# Patient Record
Sex: FEMALE | Race: AMERICAN INDIAN OR ALASKA NATIVE | NOT HISPANIC OR LATINO | Employment: UNEMPLOYED
[De-identification: names, ages, dates, MRNs, and addresses within clinical notes are randomized per-mention and may not be internally consistent; named-entity substitution may affect disease eponyms.]

---

## 2017-03-23 ENCOUNTER — RECORDS - HEALTHEAST (OUTPATIENT)
Dept: ADMINISTRATIVE | Facility: OTHER | Age: 32
End: 2017-03-23

## 2017-03-28 ENCOUNTER — AMBULATORY - HEALTHEAST (OUTPATIENT)
Dept: HEALTH INFORMATION MANAGEMENT | Facility: CLINIC | Age: 32
End: 2017-03-28

## 2017-09-27 ENCOUNTER — OFFICE VISIT (OUTPATIENT)
Dept: FAMILY MEDICINE | Facility: CLINIC | Age: 32
End: 2017-09-27
Payer: COMMERCIAL

## 2017-09-27 VITALS
RESPIRATION RATE: 18 BRPM | HEART RATE: 74 BPM | TEMPERATURE: 97.8 F | WEIGHT: 242 LBS | SYSTOLIC BLOOD PRESSURE: 120 MMHG | OXYGEN SATURATION: 98 % | BODY MASS INDEX: 45.73 KG/M2 | DIASTOLIC BLOOD PRESSURE: 84 MMHG

## 2017-09-27 DIAGNOSIS — F43.23 ADJUSTMENT DISORDER WITH MIXED ANXIETY AND DEPRESSED MOOD: Primary | ICD-10-CM

## 2017-09-27 DIAGNOSIS — F43.21 GRIEF REACTION: ICD-10-CM

## 2017-09-27 DIAGNOSIS — F41.0 PANIC DISORDER WITHOUT AGORAPHOBIA: ICD-10-CM

## 2017-09-27 PROCEDURE — 99203 OFFICE O/P NEW LOW 30 MIN: CPT | Performed by: PHYSICIAN ASSISTANT

## 2017-09-27 RX ORDER — ALPRAZOLAM 0.25 MG
0.25 TABLET ORAL DAILY PRN
Qty: 20 TABLET | Refills: 0 | Status: SHIPPED | OUTPATIENT
Start: 2017-09-27 | End: 2017-10-20

## 2017-09-27 RX ORDER — LORAZEPAM 0.5 MG/1
0.5 TABLET ORAL DAILY PRN
Qty: 20 TABLET | Refills: 0 | Status: SHIPPED | OUTPATIENT
Start: 2017-09-27 | End: 2017-09-27

## 2017-09-27 RX ORDER — TRAZODONE HYDROCHLORIDE 50 MG/1
50 TABLET, FILM COATED ORAL AT BEDTIME
Qty: 30 TABLET | Refills: 1 | Status: SHIPPED | OUTPATIENT
Start: 2017-09-27 | End: 2017-11-17 | Stop reason: SINTOL

## 2017-09-27 ASSESSMENT — ANXIETY QUESTIONNAIRES
7. FEELING AFRAID AS IF SOMETHING AWFUL MIGHT HAPPEN: NEARLY EVERY DAY
IF YOU CHECKED OFF ANY PROBLEMS ON THIS QUESTIONNAIRE, HOW DIFFICULT HAVE THESE PROBLEMS MADE IT FOR YOU TO DO YOUR WORK, TAKE CARE OF THINGS AT HOME, OR GET ALONG WITH OTHER PEOPLE: VERY DIFFICULT
2. NOT BEING ABLE TO STOP OR CONTROL WORRYING: NEARLY EVERY DAY
5. BEING SO RESTLESS THAT IT IS HARD TO SIT STILL: NEARLY EVERY DAY
3. WORRYING TOO MUCH ABOUT DIFFERENT THINGS: NEARLY EVERY DAY
6. BECOMING EASILY ANNOYED OR IRRITABLE: NEARLY EVERY DAY
1. FEELING NERVOUS, ANXIOUS, OR ON EDGE: NEARLY EVERY DAY
GAD7 TOTAL SCORE: 21

## 2017-09-27 ASSESSMENT — PATIENT HEALTH QUESTIONNAIRE - PHQ9
5. POOR APPETITE OR OVEREATING: NEARLY EVERY DAY
SUM OF ALL RESPONSES TO PHQ QUESTIONS 1-9: 14

## 2017-09-27 NOTE — MR AVS SNAPSHOT
After Visit Summary   9/27/2017    Sabine Burnett    MRN: [id number]           Patient Information     Date Of Birth          1985        Visit Information        Provider Department      9/27/2017 1:50 PM Magdalena Schwarz PA-C [facility]        Today's Diagnoses     Adjustment disorder with mixed anxiety and depressed mood    -  1       Follow-ups after your visit        Additional Services     MENTAL HEALTH REFERRAL       Your provider has referred you to: [facility]: [facility] - Counseling (Individual/Couples/Family) - [facility] [phone]   [email or web address]   *Patient will be contacted by [facility] scheduling partner, [facility]), to schedule an appointment.  Patients may also call [facility] to schedule.    All scheduling is subject to the client's specific insurance plan & benefits, provider/location availability, and provider clinical specialities.  Please arrive 15 minutes early for your first appointment and bring your completed paperwork.    Please be aware that coverage of these services is subject to the terms and limitations of your health insurance plan.  Call member services at your health plan with any benefit or coverage questions.                  Who to contact     If you have questions or need follow up information about today's clinic visit or your schedule please contact [facility] directly at [phone].  Normal or non-critical lab and imaging results will be communicated to you by MyChart, letter or phone within 4 business days after the clinic has received the results. If you do not hear from us within 7 days, please contact the clinic through MyChart or phone. If you have a critical or abnormal lab result, we will notify you by phone as soon as possible.  Submit refill requests through  "MyChart or call your pharmacy and they will forward the refill request to us. Please allow 3 business days for your refill to be completed.          Additional Information About Your Visit        MyChart Information     [facility] lets you send messages to your doctor, view your test results, renew your prescriptions, schedule appointments and more. To sign up, go to [email or web address] . Click on \"Log in\" on the left side of the screen, which will take you to the Welcome page. Then click on \"Sign up Now\" on the right side of the page.     You will be asked to enter the access code listed below, as well as some personal information. Please follow the directions to create your username and password.     Your access code is: [email or web address]  Expires: 2017  2:36 PM     Your access code will  in 90 days. If you need help or a new code, please call your [place] clinic or [phone].        Care EveryWhere ID     This is your Care EveryWhere ID. This could be used by other organizations to access your [place] medical records  [id number]        Your Vitals Were     Pulse Temperature Respirations Pulse Oximetry BMI (Body Mass Index)       74 97.8  F (36.6  C) 18 98% 45.73 kg/m2        Blood Pressure from Last 3 Encounters:   17 120/84   16 118/59   11/15/14 (!) 148/98    Weight from Last 3 Encounters:   17 242 lb (109.8 kg)   11/15/14 245 lb (111.1 kg)   13 215 lb (97.5 kg)              We Performed the Following     MENTAL HEALTH REFERRAL          Today's Medication Changes          These changes are accurate as of: 17  2:36 PM.  If you have any questions, ask your nurse or doctor.               Start taking these medicines.        Dose/Directions    LORazepam 0.5 MG tablet   Commonly known as:  ATIVAN   Used for:  Adjustment disorder with mixed anxiety and depressed mood   Started by:  Magdalena Schwarz PA-C        Dose:  0.5 mg   Take 1 tablet (0.5 mg) by mouth daily as " needed for anxiety Do not operate a vehicle after taking this medication   Quantity:  20 tablet   Refills:  0       traZODone 50 MG tablet   Commonly known as:  DESYREL   Used for:  Adjustment disorder with mixed anxiety and depressed mood   Started by:  Magdalena Schawrz PA-C        Dose:  50 mg   Take 1 tablet (50 mg) by mouth At Bedtime   Quantity:  30 tablet   Refills:  1            Where to get your medicines      These medications were sent to [facility] [address] [facility] [address] AT [facility] [address] [facility]  [address] [facility] [address]     Phone:  [phone]     traZODone 50 MG tablet         Some of these will need a paper prescription and others can be bought over the counter.  Ask your nurse if you have questions.     Bring a paper prescription for each of these medications     LORazepam 0.5 MG tablet                Primary Care Provider    Physician No Ref-Primary       No address on file        Equal Access to Services     WOODROW HOLLOWAY : Hadii williams Harris, waaxda luqadaha, qaybta kaalmada adeegyada, arabella gomes . So [facility] [phone].    ATENCIÓN: Si habla español, tiene a cutler disposición servicios gratuitos de asistencia lingüística. Any al [phone].    We comply with applicable federal civil rights laws and [place] laws. We do not discriminate on the basis of race, color, national origin, age, disability sex, sexual orientation or gender identity.            Thank you!     Thank you for choosing [facility]  for your care. Our goal is always to provide you with excellent care. Hearing back from our patients is one way we can continue to improve our services. Please take a few minutes to complete the written survey that you may receive in the mail after your visit with us. Thank you!             Your Updated Medication List - Protect others around you: Learn how to safely  use, store and throw away your medicines at www.disposemymeds.org.          This list is accurate as of: 9/27/17  2:36 PM.  Always use your most recent med list.                   Brand Name Dispense Instructions for use Diagnosis    carisoprodol 350 MG tablet    SOMA    60 tablet    Take 1 tablet (350 mg) by mouth 3 times daily as needed for muscle spasms    Back pain       LORazepam 0.5 MG tablet    ATIVAN    20 tablet    Take 1 tablet (0.5 mg) by mouth daily as needed for anxiety Do not operate a vehicle after taking this medication    Adjustment disorder with mixed anxiety and depressed mood       traMADol 50 MG tablet    ULTRAM    20 tablet    Take 1 tablet (50 mg) by mouth every 6 hours as needed for moderate pain        traZODone 50 MG tablet    DESYREL    30 tablet    Take 1 tablet (50 mg) by mouth At Bedtime    Adjustment disorder with mixed anxiety and depressed mood       zolpidem 10 MG tablet    AMBIEN    30 tablet    Take 0.5 tablets (5 mg) by mouth nightly as needed for sleep    Hypersomnia with sleep apnoea
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
## 2017-09-27 NOTE — NURSING NOTE
"Chief Complaint   Patient presents with     Anxiety     lost son on 9/2/2017 also lost an uncle and adoptive mom this month       Initial /84  Pulse 74  Temp 97.8  F (36.6  C)  Resp 18  Wt 242 lb (109.8 kg)  SpO2 98%  BMI 45.73 kg/m2 Estimated body mass index is 45.73 kg/(m^2) as calculated from the following:    Height as of 9/30/16: 5' 1\" (1.549 m).    Weight as of this encounter: 242 lb (109.8 kg).  Medication Reconciliation: dedrick Sierra CMA      "

## 2017-09-28 ASSESSMENT — ANXIETY QUESTIONNAIRES: GAD7 TOTAL SCORE: 21

## 2017-10-06 PROBLEM — F41.0 PANIC DISORDER WITHOUT AGORAPHOBIA: Status: ACTIVE | Noted: 2017-10-06

## 2017-10-06 PROBLEM — F43.23 ADJUSTMENT DISORDER WITH MIXED ANXIETY AND DEPRESSED MOOD: Status: ACTIVE | Noted: 2017-10-06

## 2017-10-20 ENCOUNTER — OFFICE VISIT (OUTPATIENT)
Dept: FAMILY MEDICINE | Facility: CLINIC | Age: 32
End: 2017-10-20
Payer: COMMERCIAL

## 2017-10-20 VITALS
DIASTOLIC BLOOD PRESSURE: 86 MMHG | RESPIRATION RATE: 18 BRPM | WEIGHT: 240 LBS | TEMPERATURE: 98.2 F | BODY MASS INDEX: 45.35 KG/M2 | OXYGEN SATURATION: 98 % | HEART RATE: 80 BPM | SYSTOLIC BLOOD PRESSURE: 130 MMHG

## 2017-10-20 DIAGNOSIS — F41.0 PANIC DISORDER WITHOUT AGORAPHOBIA: ICD-10-CM

## 2017-10-20 DIAGNOSIS — F43.23 ADJUSTMENT DISORDER WITH MIXED ANXIETY AND DEPRESSED MOOD: Primary | ICD-10-CM

## 2017-10-20 DIAGNOSIS — F43.21 GRIEF REACTION: ICD-10-CM

## 2017-10-20 PROBLEM — E66.01 MORBID OBESITY (H): Status: ACTIVE | Noted: 2017-10-20

## 2017-10-20 PROCEDURE — 99214 OFFICE O/P EST MOD 30 MIN: CPT | Performed by: PHYSICIAN ASSISTANT

## 2017-10-20 RX ORDER — ALPRAZOLAM 1 MG
1 TABLET ORAL DAILY PRN
Qty: 30 TABLET | Refills: 0 | Status: SHIPPED | OUTPATIENT
Start: 2017-10-20 | End: 2017-11-17

## 2017-10-20 RX ORDER — FLUOXETINE 10 MG/1
TABLET, FILM COATED ORAL
Qty: 60 TABLET | Refills: 0 | Status: SHIPPED | OUTPATIENT
Start: 2017-10-20 | End: 2017-11-17

## 2017-10-20 NOTE — NURSING NOTE
"Chief Complaint   Patient presents with     RECHECK       Initial /86  Pulse 80  Temp 98.2  F (36.8  C)  Resp 18  Wt 240 lb (108.9 kg)  SpO2 98%  BMI 45.35 kg/m2 Estimated body mass index is 45.35 kg/(m^2) as calculated from the following:    Height as of 9/30/16: 5' 1\" (1.549 m).    Weight as of this encounter: 240 lb (108.9 kg).  Medication Reconciliation: dedrick Sierra CMA      "

## 2017-10-20 NOTE — PROGRESS NOTES
SUBJECTIVE:   Sabine Burnett is a 32 year old female who presents to clinic today for the following health issues:      Depression and Anxiety Follow-Up    Status since last visit: Worsened pt says she is not getting better and feels worse.  Pt states that they cant tell her why her son . Pt also states that there were 5 deaths in her family with in 2 weeks.    Other associated symptoms:None    Complicating factors:     Significant life event: Yes-  Death of son     Current substance abuse: None    PHQ-9 Score and MyChart F/U Questions 2017   Total Score 14   Q9: Suicide Ideation Not at all     JOSE-7 SCORE 2017   Total Score 21     PHQ-9  English  PHQ-9   Any Language  GAD7  Suicide Assessment Five-step Evaluation and Treatment (SAFE-T)      HPI additional notes:   Chief Complaint   Patient presents with     RECHECK     Rosenda presents today for f/u anxiety and grief reaction. Patient's son passed away approx 1 month ago. Started on trazodone qHS and Xanax qd prn during our initial visit on 2017. Patient reports she is able to get consistent sleep with assistance of trazodone. However, feels like anxiety and depression have worsened as autopsy didn't reveal cause of death of her son. Patient feels like Xanax isn't strong enough, states she needed the 1 mg dose in the past to be effective. Has been using 1-2 times per day. Never scheduled with  as she had wrong phone number listed in her chart.     ROS:  Skin: negative  Eyes: negative  Ears/Nose/Throat: negative  Respiratory: No shortness of breath, dyspnea on exertion, cough, or hemoptysis  Cardiovascular: negative  Gastrointestinal: negative  Genitourinary: negative  Musculoskeletal: negative  Neurologic: negative  Psychiatric: as above  Hematologic/Lymphatic/Immunologic: negative  Endocrine: negative    Chart Review:  History   Smoking Status     Current Every Day Smoker     Packs/day: 0.50     Years: 6.00     Types: Cigarettes    Smokeless Tobacco     Never Used     Comment: declined quit program 3/19/12       Patient Active Problem List   Diagnosis     Back pain     Adjustment disorder with mixed anxiety and depressed mood     Panic disorder without agoraphobia     History reviewed. No pertinent surgical history.  Problem list, Medication list, Allergies, Medical/Social/Surg hx reviewed in [facility], updated as appropriate.   OBJECTIVE:                                                    /86  Pulse 80  Temp 98.2  F (36.8  C)  Resp 18  Wt 240 lb (108.9 kg)  SpO2 98%  BMI 45.35 kg/m2  Body mass index is 45.35 kg/(m^2).  GENERAL: obese, no acute distress  PSYCH: Mental Status Exam  Behavior: cooperative and calm  Speech: normal rate and rhythm  Mood: depressed  Affect: Tearful  Thought Processes: Logical and Linear  Thought Content: denies suicidal ideation, intent or thoughts  Insight: Good  Judgment: Adequate for safety  EYES: no discharge, no injection  HENT:  Normocephalic. Moist mucus membranes.  NECK:  Supple, symmetric  EXTREMITIES:  No gross deformities, moves all 4 limbs spontaneously, no peripheral edema  SKIN:  Warm and dry, no rash or suspicious lesions    NEUROLOGIC: alert, sensation grossly intact.    Diagnostic test results: none     ASSESSMENT/PLAN:                                                          ICD-10-CM    1. Adjustment disorder with mixed anxiety and depressed mood F43.23 MENTAL HEALTH REFERRAL     FLUoxetine (PROZAC) 10 MG tablet   2. Grief reaction F43.20 MENTAL HEALTH REFERRAL   3. Panic disorder without agoraphobia F41.0 MENTAL HEALTH REFERRAL     ALPRAZolam (XANAX) 1 MG tablet     FLUoxetine (PROZAC) 10 MG tablet     Again referred to mental health to establish with counselor. Given resources for parents of children that have passed of SIDS/SUDC, encouraged patient to look into support groups or just contact their hotlines. Continue trazodone as current dose as it seems effective. Discussed no more  than once daily use of Xanax if increasing to 1 mg, patient agreeable, given #30, expected to last at least 1 month. Also discussed starting daily SSRI to help with depression and anxiety, though discussed still needs counseling and support groups to help with grief. Start fluoxetine 10 mg qd, may increase to 20 mg qd if well tolerated after 1 week.    Please see patient instructions for treatment details.  25 minutes total spent on this encounter, >50% spent in direct communication with the patient.    Follow up office visit in 1 month, sooner PRN.    Magdalena Schwarz PA-C  [facility]

## 2017-10-20 NOTE — PATIENT INSTRUCTIONS
Infant Loss Resources  Grief support: 1-703.419.3745  Http://infantlossresources.org/    [employer]  Grief support: 1-958.936.1081  Http://[employer].org/    [employer]  Https://www.compassionatefriends.org/  Online support groups available    [place] Chapter  [address]  [facility] [address]    [facility]  [address]  [facility] [address]    [facility] - Pregnancy and Infant Loss  [email or web address]

## 2017-10-20 NOTE — MR AVS SNAPSHOT
After Visit Summary   10/20/2017    Sabine Burnett    MRN: [id number]           Patient Information     Date Of Birth          1985        Visit Information        Provider Department      10/20/2017 10:10 AM Magdalena Schwarz PA-C [facility]        Today's Diagnoses     Adjustment disorder with mixed anxiety and depressed mood    -  1    Grief reaction        Panic disorder without agoraphobia          Care Instructions    Infant Loss Resources  Grief support: [phone]  Http://infantlossresources.org/    [employer]  Grief support: [phone]  Http://firstcandle.org/    [employer]  Https://www.compassionatefriends.org/  Online support groups available    [facility]  [address]  [facility] [address]    [facility]  [address]  [facility] [address]    Share - Pregnancy and Infant Loss  Http://[email or web address]/                            Follow-ups after your visit        Additional Services     MENTAL HEALTH REFERRAL       Your provider has referred you to: FMG: [facility] - Counseling (Individual/Couples/Family) - [facility] [phone]   [email or web address]/   *Patient will be contacted by [facility]'s scheduling partner, [facility]), to schedule an appointment.  Patients may also call [facility] to schedule.    All scheduling is subject to the client's specific insurance plan & benefits, provider/location availability, and provider clinical specialities.  Please arrive 15 minutes early for your first appointment and bring your completed paperwork.    Please be aware that coverage of these services is subject to the terms and limitations of your health insurance plan.  Call member services at your health plan with any benefit or coverage questions.                  Who to contact     If you have  "questions or need follow up information about today's clinic visit or your schedule please contact [facility] directly at [phone].  Normal or non-critical lab and imaging results will be communicated to you by MyChart, letter or phone within 4 business days after the clinic has received the results. If you do not hear from us within 7 days, please contact the clinic through [employer] or phone. If you have a critical or abnormal lab result, we will notify you by phone as soon as possible.  Submit refill requests through [employer] or call your pharmacy and they will forward the refill request to us. Please allow 3 business days for your refill to be completed.          Additional Information About Your Visit        [employer] Information     [employer] lets you send messages to your doctor, view your test results, renew your prescriptions, schedule appointments and more. To sign up, go to [email or web address] . Click on \"Log in\" on the left side of the screen, which will take you to the Welcome page. Then click on \"Sign up Now\" on the right side of the page.     You will be asked to enter the access code listed below, as well as some personal information. Please follow the directions to create your username and password.     Your access code is: [email or web address]  Expires: 2017  2:36 PM     Your access code will  in 90 days. If you need help or a new code, please call your [place] clinic or [phone].        Care EveryWhere ID     This is your Care EveryWhere ID. This could be used by other organizations to access your [place] medical records  [id number]        Your Vitals Were     Pulse Temperature Respirations Pulse Oximetry BMI (Body Mass Index)       80 98.2  F (36.8  C) 18 98% 45.35 kg/m2        Blood Pressure from Last 3 Encounters:   10/20/17 130/86   17 120/84   16 118/59    Weight from Last 3 Encounters:   10/20/17 240 lb (108.9 kg)   17 242 lb " (109.8 kg)   11/15/14 245 lb (111.1 kg)              We Performed the Following     MENTAL HEALTH REFERRAL          Today's Medication Changes          These changes are accurate as of: 10/20/17 11:12 AM.  If you have any questions, ask your nurse or doctor.               Start taking these medicines.        Dose/Directions    FLUoxetine 10 MG tablet   Commonly known as:  PROzac   Used for:  Adjustment disorder with mixed anxiety and depressed mood, Panic disorder without agoraphobia   Started by:  Magdalena Schwarz PA-C        Take 1 tab qd x7 days, then increase to 2 tabs qd   Quantity:  60 tablet   Refills:  0         These medicines have changed or have updated prescriptions.        Dose/Directions    ALPRAZolam 1 MG tablet   Commonly known as:  XANAX   This may have changed:    - medication strength  - how much to take   Used for:  Panic disorder without agoraphobia   Changed by:  Magdalena Schwarz PA-C        Dose:  1 mg   Take 1 tablet (1 mg) by mouth daily as needed for anxiety   Quantity:  30 tablet   Refills:  0            Where to get your medicines      These medications were sent to [facility] [address] [facility] [address] AT [facility] [address] [facility]  [address] [facility] [address]     Phone:  [phone]     FLUoxetine 10 MG tablet         Some of these will need a paper prescription and others can be bought over the counter.  Ask your nurse if you have questions.     Bring a paper prescription for each of these medications     ALPRAZolam 1 MG tablet                Primary Care Provider    Physician No Ref-Primary       NO REF-PRIMARY PHYSICIAN        Equal Access to Services     WOODROW HOLLOWAY AH: German tran Somigel, waaxda luqadaha, qaybta kaalmada adeegjoey, arabella remy. So [facility] [phone].    ATENCIÓN: Si habla español, tiene a cutler disposición servicios gratuitos de asistencia lingüística. Llame al [phone].    We  comply with applicable federal civil rights laws and [place] laws. We do not discriminate on the basis of race, color, national origin, age, disability, sex, sexual orientation, or gender identity.            Thank you!     Thank you for choosing [facility]  for your care. Our goal is always to provide you with excellent care. Hearing back from our patients is one way we can continue to improve our services. Please take a few minutes to complete the written survey that you may receive in the mail after your visit with us. Thank you!             Your Updated Medication List - Protect others around you: Learn how to safely use, store and throw away your medicines at www.disposemymeds.org.          This list is accurate as of: 10/20/17 11:12 AM.  Always use your most recent med list.                   Brand Name Dispense Instructions for use Diagnosis    ALPRAZolam 1 MG tablet    XANAX    30 tablet    Take 1 tablet (1 mg) by mouth daily as needed for anxiety    Panic disorder without agoraphobia       FLUoxetine 10 MG tablet    PROzac    60 tablet    Take 1 tab qd x7 days, then increase to 2 tabs qd    Adjustment disorder with mixed anxiety and depressed mood, Panic disorder without agoraphobia       traZODone 50 MG tablet    DESYREL    30 tablet    Take 1 tablet (50 mg) by mouth At Bedtime    Adjustment disorder with mixed anxiety and depressed mood

## 2017-11-17 ENCOUNTER — OFFICE VISIT (OUTPATIENT)
Dept: FAMILY MEDICINE | Facility: CLINIC | Age: 32
End: 2017-11-17
Payer: COMMERCIAL

## 2017-11-17 VITALS
TEMPERATURE: 98.6 F | DIASTOLIC BLOOD PRESSURE: 80 MMHG | SYSTOLIC BLOOD PRESSURE: 130 MMHG | WEIGHT: 247 LBS | HEART RATE: 96 BPM | BODY MASS INDEX: 46.67 KG/M2 | RESPIRATION RATE: 16 BRPM

## 2017-11-17 DIAGNOSIS — F43.21 GRIEF AT LOSS OF CHILD: ICD-10-CM

## 2017-11-17 DIAGNOSIS — Z63.4 GRIEF AT LOSS OF CHILD: ICD-10-CM

## 2017-11-17 DIAGNOSIS — F41.0 PANIC DISORDER WITHOUT AGORAPHOBIA: ICD-10-CM

## 2017-11-17 DIAGNOSIS — F43.23 ADJUSTMENT DISORDER WITH MIXED ANXIETY AND DEPRESSED MOOD: Primary | ICD-10-CM

## 2017-11-17 DIAGNOSIS — F51.04 PSYCHOPHYSIOLOGICAL INSOMNIA: ICD-10-CM

## 2017-11-17 PROCEDURE — 99214 OFFICE O/P EST MOD 30 MIN: CPT | Performed by: PHYSICIAN ASSISTANT

## 2017-11-17 RX ORDER — PROPRANOLOL HYDROCHLORIDE 10 MG/1
10 TABLET ORAL 2 TIMES DAILY
Qty: 60 TABLET | Refills: 1 | Status: SHIPPED | OUTPATIENT
Start: 2017-11-17 | End: 2017-12-20

## 2017-11-17 RX ORDER — ZOLPIDEM TARTRATE 5 MG/1
5 TABLET ORAL
Qty: 12 TABLET | Refills: 0 | Status: SHIPPED | OUTPATIENT
Start: 2017-11-17 | End: 2017-12-20

## 2017-11-17 RX ORDER — FLUOXETINE 20 MG/1
20 TABLET, FILM COATED ORAL DAILY
Qty: 30 TABLET | Refills: 1 | Status: SHIPPED | OUTPATIENT
Start: 2017-11-17 | End: 2018-01-19

## 2017-11-17 RX ORDER — ALPRAZOLAM 1 MG
1 TABLET ORAL DAILY PRN
Qty: 30 TABLET | Refills: 0 | Status: SHIPPED | OUTPATIENT
Start: 2017-11-17 | End: 2017-12-20

## 2017-11-17 SDOH — SOCIAL STABILITY - SOCIAL INSECURITY: DISSAPEARANCE AND DEATH OF FAMILY MEMBER: Z63.4

## 2017-11-17 ASSESSMENT — PATIENT HEALTH QUESTIONNAIRE - PHQ9
SUM OF ALL RESPONSES TO PHQ QUESTIONS 1-9: 11
5. POOR APPETITE OR OVEREATING: MORE THAN HALF THE DAYS

## 2017-11-17 ASSESSMENT — ANXIETY QUESTIONNAIRES
5. BEING SO RESTLESS THAT IT IS HARD TO SIT STILL: MORE THAN HALF THE DAYS
3. WORRYING TOO MUCH ABOUT DIFFERENT THINGS: MORE THAN HALF THE DAYS
1. FEELING NERVOUS, ANXIOUS, OR ON EDGE: MORE THAN HALF THE DAYS
7. FEELING AFRAID AS IF SOMETHING AWFUL MIGHT HAPPEN: SEVERAL DAYS
IF YOU CHECKED OFF ANY PROBLEMS ON THIS QUESTIONNAIRE, HOW DIFFICULT HAVE THESE PROBLEMS MADE IT FOR YOU TO DO YOUR WORK, TAKE CARE OF THINGS AT HOME, OR GET ALONG WITH OTHER PEOPLE: SOMEWHAT DIFFICULT
2. NOT BEING ABLE TO STOP OR CONTROL WORRYING: MORE THAN HALF THE DAYS
GAD7 TOTAL SCORE: 14
6. BECOMING EASILY ANNOYED OR IRRITABLE: NEARLY EVERY DAY

## 2017-11-17 NOTE — MR AVS SNAPSHOT
After Visit Summary   11/17/2017    Sabine Burnett    MRN: [id number]           Patient Information     Date Of Birth          1985        Visit Information        Provider Department      11/17/2017 3:20 PM Magdalena Schwarz PA-C [facility]        Today's Diagnoses     Adjustment disorder with mixed anxiety and depressed mood    -  1    Panic disorder without agoraphobia        Psychophysiological insomnia           Follow-ups after your visit        Additional Services     MENTAL HEALTH REFERRAL  - Adult; Outpatient Treatment; Individual/Couples/Family/Group Therapy/Health Psychology; [facility]: [facility] [phone]; The scheduling team will contact you to schedule your appointment.  If you have any ...       All scheduling is subject to the client's specific insurance plan & benefits, provider/location availability, and provider clinical specialities.  Please arrive 15 minutes early for your first appointment and bring your completed paperwork.    Please be aware that coverage of these services is subject to the terms and limitations of your health insurance plan.  Call member services at your health plan with any benefit or coverage questions.                            Follow-up notes from your care team     Return in about 1 month (around 12/17/2017) for anxiety/depression.      Who to contact     If you have questions or need follow up information about today's clinic visit or your schedule please contact [facility] directly at [phone].  Normal or non-critical lab and imaging results will be communicated to you by MyChart, letter or phone within 4 business days after the clinic has received the results. If you do not hear from us within 7 days, please contact the clinic through MyChart or phone. If you have a critical or abnormal lab result, we will notify you by phone as soon as  "possible.  Submit refill requests through [employer] or call your pharmacy and they will forward the refill request to us. Please allow 3 business days for your refill to be completed.          Additional Information About Your Visit        [employer] Information     [employer] lets you send messages to your doctor, view your test results, renew your prescriptions, schedule appointments and more. To sign up, go to [email or web address] . Click on \"Log in\" on the left side of the screen, which will take you to the Welcome page. Then click on \"Sign up Now\" on the right side of the page.     You will be asked to enter the access code listed below, as well as some personal information. Please follow the directions to create your username and password.     Your access code is: [email or web address]  Expires: 2017  1:36 PM     Your access code will  in 90 days. If you need help or a new code, please call your [place] clinic or [phone].        Care EveryWhere ID     This is your Care EveryWhere ID. This could be used by other organizations to access your [place] medical records  [id number]        Your Vitals Were     Pulse Temperature Respirations BMI (Body Mass Index)          96 98.6  F (37  C) (Tympanic) 16 46.67 kg/m2         Blood Pressure from Last 3 Encounters:   17 130/80   10/20/17 130/86   17 120/84    Weight from Last 3 Encounters:   17 247 lb (112 kg)   10/20/17 240 lb (108.9 kg)   17 242 lb (109.8 kg)              We Performed the Following     DEPRESSION ACTION PLAN (DAP)     MENTAL HEALTH REFERRAL  - Adult; Outpatient Treatment; Individual/Couples/Family/Group Therapy/Health Psychology; [facility]: [facility] [phone]; The scheduling team will contact you to schedule your appointment.  If you have any ...          Today's Medication Changes          These changes are accurate as of: 17  3:43 PM.  If you have any questions, ask your nurse or doctor.          "      Start taking these medicines.        Dose/Directions    propranolol 10 MG tablet   Commonly known as:  INDERAL   Used for:  Panic disorder without agoraphobia   Started by:  Magdalena Schwarz PA-C        Dose:  10 mg   Take 1 tablet (10 mg) by mouth 2 times daily   Quantity:  60 tablet   Refills:  1       zolpidem 5 MG tablet   Commonly known as:  AMBIEN   Used for:  Psychophysiological insomnia   Started by:  Magdalena Schwarz PA-C        Dose:  5 mg   Take 1 tablet (5 mg) by mouth nightly as needed for sleep   Quantity:  12 tablet   Refills:  0         These medicines have changed or have updated prescriptions.        Dose/Directions    FLUoxetine 20 MG tablet   This may have changed:    - medication strength  - how much to take  - how to take this  - when to take this  - additional instructions   Used for:  Adjustment disorder with mixed anxiety and depressed mood, Panic disorder without agoraphobia   Changed by:  Magdalena Schwarz PA-C        Dose:  20 mg   Take 1 tablet (20 mg) by mouth daily   Quantity:  30 tablet   Refills:  1         Stop taking these medicines if you haven't already. Please contact your care team if you have questions.     traZODone 50 MG tablet   Commonly known as:  DESYREL   Stopped by:  Magdalena Schwarz PA-C                Where to get your medicines      These medications were sent to [facility] [address] [facility] [address] [place] [address] AT [facility] [address]  [address]  [address] [facility] [address]     Phone:  [phone]     FLUoxetine 20 MG tablet    propranolol 10 MG tablet         Some of these will need a paper prescription and others can be bought over the counter.  Ask your nurse if you have questions.     Bring a paper prescription for each of these medications     ALPRAZolam 1 MG tablet    zolpidem 5 MG tablet                Primary Care Provider    Physician No Ref-Primary       NO REF-PRIMARY PHYSICIAN        Equal Access to Services      WOODROW HOLLOWAY : Hadii aad ku chelsea Somigel, waaxda luqadaha, qaybta kaalmada adeamadeotim, arabella carl haycarolynn wallerjo annadebayo gomes . So [facility] [phone].    ATENCIÓN: Si habla español, tiene a cutler disposición servicios gratuitos de asistencia lingüística. Llame al [phone].    We comply with applicable federal civil rights laws and [place] laws. We do not discriminate on the basis of race, color, national origin, age, disability, sex, sexual orientation, or gender identity.            Thank you!     Thank you for choosing [facility]  for your care. Our goal is always to provide you with excellent care. Hearing back from our patients is one way we can continue to improve our services. Please take a few minutes to complete the written survey that you may receive in the mail after your visit with us. Thank you!             Your Updated Medication List - Protect others around you: Learn how to safely use, store and throw away your medicines at www.disposemymeds.org.          This list is accurate as of: 11/17/17  3:43 PM.  Always use your most recent med list.                   Brand Name Dispense Instructions for use Diagnosis    ALPRAZolam 1 MG tablet    XANAX    30 tablet    Take 1 tablet (1 mg) by mouth daily as needed for anxiety    Panic disorder without agoraphobia       FLUoxetine 20 MG tablet     30 tablet    Take 1 tablet (20 mg) by mouth daily    Adjustment disorder with mixed anxiety and depressed mood, Panic disorder without agoraphobia       propranolol 10 MG tablet    INDERAL    60 tablet    Take 1 tablet (10 mg) by mouth 2 times daily    Panic disorder without agoraphobia       zolpidem 5 MG tablet    AMBIEN    12 tablet    Take 1 tablet (5 mg) by mouth nightly as needed for sleep    Psychophysiological insomnia

## 2017-11-17 NOTE — NURSING NOTE
"Chief Complaint   Patient presents with     Depression     Anxiety       Initial /80  Pulse 96  Temp 98.6  F (37  C) (Tympanic)  Resp 16  Wt 247 lb (112 kg)  BMI 46.67 kg/m2 Estimated body mass index is 46.67 kg/(m^2) as calculated from the following:    Height as of 9/30/16: 5' 1\" (1.549 m).    Weight as of this encounter: 247 lb (112 kg).  Medication Reconciliation: complete     Kaley Carroll CMA      "

## 2017-11-17 NOTE — PROGRESS NOTES
"  SUBJECTIVE:   Sabine Burnett is a 32 year old female who presents to clinic today for the following health issues:    Depression and Anxiety Follow-Up    Status since last visit: No change    Other associated symptoms:still having panic attacks daily    Complicating factors:     Significant life event: Yes-  Recently her baby son     Current substance abuse: None    PHQ-9 Score and MyChart F/U Questions 9/27/2017 11/17/2017   Total Score 14 11   Q9: Suicide Ideation Not at all Not at all     JOSE-7 SCORE 9/27/2017 11/17/2017   Total Score 21 14     PHQ-9  English  PHQ-9   Any Language  GAD7  Suicide Assessment Five-step Evaluation and Treatment (SAFE-T)      Amount of exercise or physical activity: None    Problems taking medications regularly: No    Medication side effects: dizziness     Diet: regular (no restrictions)      HPI additional notes:    Chief Complaint   Patient presents with     Depression     Anxiety     Rosenda presents today for f/u depression & anxiety. Patient's 5 month-old son passed away 9/2/2017. Patient started on fluoxetine at last visit, decreased Xanax to 1 tab qd, continued trazodone for sleep. Patient reports she continues to have daily panic attacks, would like to increase Xanax. Continues to feel anxious and depressed, feels fluoxetine is helping somewhat. Has not yet followed up with mental health for counseling; states she never received a phone call. Was given online resources at our last visit; doesn't \"feel ready\" to access these, has been speaking with family members who have lost infants, which she finds therapeutic. Reports trazodone was making her dizzy, so she stopped this medication. Continues to have problems falling asleep at least 50% of the time; has used Ambien in the past with relief.     ROS:    A Comprehensive greater than 10 system review of systems was carried out.  Pertinent positives and negatives are noted above.  Otherwise negative for contributory " information.    Chart Review:  History   Smoking Status     Current Every Day Smoker     Packs/day: 0.50     Years: 6.00     Types: Cigarettes   Smokeless Tobacco     Never Used     Comment: declined quit program 3/19/12       Patient Active Problem List   Diagnosis     Back pain     Adjustment disorder with mixed anxiety and depressed mood     Panic disorder without agoraphobia     Morbid obesity (H)     Grief at loss of child     History reviewed. No pertinent surgical history.  Problem list, Medication list, Allergies, Medical/Social/Surg hx reviewed in [facility], updated as appropriate.   OBJECTIVE:                                                    /80  Pulse 96  Temp 98.6  F (37  C) (Tympanic)  Resp 16  Wt 247 lb (112 kg)  BMI 46.67 kg/m2  Body mass index is 46.67 kg/(m^2).  GENERAL:  WDWN, no acute distress  PSYCH: Mental Status Exam  Behavior: pleasant and calm  Speech: normal rate and rhythm  Mood: depressed and anxious  Affect: Subdued, intermittently tearful  Thought Processes: Logical and Linear  Thought Content: denies suicidal ideation, intent or thoughts  Insight: Fair  Judgment: Adequate for safety  EYES: no discharge, no injection  HENT:  Normocephalic. Moist mucus membranes.  NECK:  Supple, symmetric  EXTREMITIES:  No gross deformities, moves all 4 limbs spontaneously  SKIN:  Warm and dry, no rash or suspicious lesions    NEUROLOGIC:  alert, sensation grossly intact.    Diagnostic test results: none     ASSESSMENT/PLAN:                                                          ICD-10-CM    1. Adjustment disorder with mixed anxiety and depressed mood F43.23 MENTAL HEALTH REFERRAL  - Adult; Outpatient Treatment; Individual/Couples/Family/Group Therapy/Health Psychology; [facility] [phone]; The scheduling team will contact you to schedule your appointment.  If you have any ...     FLUoxetine 20 MG tablet   2. Grief at loss of child F43.21     Z63.4    3. Panic  disorder without agoraphobia F41.0 MENTAL HEALTH REFERRAL  - Adult; Outpatient Treatment; Individual/Couples/Family/Group Therapy/Health Psychology; [facility] [phone]; The scheduling team will contact you to schedule your appointment.  If you have any ...     ALPRAZolam (XANAX) 1 MG tablet     FLUoxetine 20 MG tablet     propranolol (INDERAL) 10 MG tablet   4. Psychophysiological insomnia F51.04 zolpidem (AMBIEN) 5 MG tablet     Again stressed importance of counseling and working through feelings of grief with patient; referral placed again to behavioral health to get patient scheduled with counselor, also gave patient contact number to call if she doesn't here from them. Discussed goal is to decrease Xanax, reviewed not a great long-term medication, would not increase at this time, giovani if starting Ambien for sleep. Xanax refilled for #30, expected to last 1 month or longer. Continue fluoxetine 20 mg qd, consider dose increase next month if continues to have high anxiety and depression. Discussed use of propranolol to help manage anxiety symptoms during the course of the day, and stress response in body due to grief. Start propranolol 10 mg BID. Discussed Ambien also not great nightly medication given high rate of dependence, and already on sedating medications. Discussed limiting to no more than 2 consecutive nights, and no more than 4 times per week. If requiring more than 1 month or needing more than 4 nights per week, consider switching to different medication (remeron, seroquel, etc) that would also help with anxiety.    Please see patient instructions for treatment details.  25 minutes total spent on this encounter, >50% spent in direct communication with the patient.    Follow up office visit in 1 month, sooner PRN.    Magdalena Schwarz PA-C  [employer]

## 2017-11-17 NOTE — Clinical Note
Please abstract the following data from this visit with this patient into the appropriate field in Epic:  Pap smear done on this date: 10/1/2016 (approximately), by this group: [facility], results were NIL.

## 2017-11-18 ASSESSMENT — ANXIETY QUESTIONNAIRES: GAD7 TOTAL SCORE: 14

## 2017-12-18 DIAGNOSIS — F41.0 PANIC DISORDER WITHOUT AGORAPHOBIA: ICD-10-CM

## 2017-12-18 DIAGNOSIS — F51.04 PSYCHOPHYSIOLOGICAL INSOMNIA: ICD-10-CM

## 2017-12-18 RX ORDER — ALPRAZOLAM 1 MG
1 TABLET ORAL DAILY PRN
Qty: 30 TABLET | Refills: 0 | Status: CANCELLED | OUTPATIENT
Start: 2017-12-18

## 2017-12-18 NOTE — TELEPHONE ENCOUNTER
Reason for Call:  Medication or medication refill:    Do you use a [facility]?  Name of the pharmacy and phone number for the current request:  [facility]    Name of the medication requested: ALPRAZolam (XANAX) 1 MG tablet and zolpidem (AMBIEN) 5 MG tablet    Other request:     Can we leave a detailed message on this number? YES    Phone number patient can be reached at: Cell number on file:    Telephone Information:   Mobile [phone]       Best Time:     Call taken on 12/18/2017 at 10:45 AM by HENNA GILLETTE

## 2017-12-20 ENCOUNTER — OFFICE VISIT (OUTPATIENT)
Dept: FAMILY MEDICINE | Facility: CLINIC | Age: 32
End: 2017-12-20
Payer: MEDICAID

## 2017-12-20 VITALS
BODY MASS INDEX: 46.54 KG/M2 | TEMPERATURE: 97.2 F | WEIGHT: 246.5 LBS | HEIGHT: 61 IN | HEART RATE: 94 BPM | DIASTOLIC BLOOD PRESSURE: 84 MMHG | OXYGEN SATURATION: 98 % | SYSTOLIC BLOOD PRESSURE: 132 MMHG

## 2017-12-20 DIAGNOSIS — F41.0 PANIC DISORDER WITHOUT AGORAPHOBIA: ICD-10-CM

## 2017-12-20 DIAGNOSIS — F43.23 ADJUSTMENT DISORDER WITH MIXED ANXIETY AND DEPRESSED MOOD: Primary | ICD-10-CM

## 2017-12-20 DIAGNOSIS — F51.04 PSYCHOPHYSIOLOGICAL INSOMNIA: ICD-10-CM

## 2017-12-20 PROCEDURE — 99214 OFFICE O/P EST MOD 30 MIN: CPT | Performed by: PHYSICIAN ASSISTANT

## 2017-12-20 RX ORDER — ZOLPIDEM TARTRATE 5 MG/1
5 TABLET ORAL
Qty: 12 TABLET | Refills: 0 | OUTPATIENT
Start: 2017-12-20

## 2017-12-20 RX ORDER — ZOLPIDEM TARTRATE 5 MG/1
5 TABLET ORAL
Qty: 12 TABLET | Refills: 0 | Status: CANCELLED | OUTPATIENT
Start: 2017-12-20

## 2017-12-20 RX ORDER — ALPRAZOLAM 1 MG
1 TABLET ORAL DAILY PRN
Qty: 20 TABLET | Refills: 0 | Status: SHIPPED | OUTPATIENT
Start: 2017-12-20 | End: 2018-01-19

## 2017-12-20 RX ORDER — PRAZOSIN HYDROCHLORIDE 1 MG/1
CAPSULE ORAL
Qty: 50 CAPSULE | Refills: 0 | Status: SHIPPED | OUTPATIENT
Start: 2017-12-20 | End: 2018-01-19

## 2017-12-20 RX ORDER — ALPRAZOLAM 1 MG
1 TABLET ORAL DAILY PRN
Qty: 30 TABLET | Refills: 0 | Status: CANCELLED | OUTPATIENT
Start: 2017-12-20

## 2017-12-20 ASSESSMENT — ANXIETY QUESTIONNAIRES
7. FEELING AFRAID AS IF SOMETHING AWFUL MIGHT HAPPEN: SEVERAL DAYS
GAD7 TOTAL SCORE: 11
1. FEELING NERVOUS, ANXIOUS, OR ON EDGE: MORE THAN HALF THE DAYS
7. FEELING AFRAID AS IF SOMETHING AWFUL MIGHT HAPPEN: SEVERAL DAYS
5. BEING SO RESTLESS THAT IT IS HARD TO SIT STILL: MORE THAN HALF THE DAYS
2. NOT BEING ABLE TO STOP OR CONTROL WORRYING: SEVERAL DAYS
6. BECOMING EASILY ANNOYED OR IRRITABLE: MORE THAN HALF THE DAYS
GAD7 TOTAL SCORE: 11
GAD7 TOTAL SCORE: 11
3. WORRYING TOO MUCH ABOUT DIFFERENT THINGS: SEVERAL DAYS
4. TROUBLE RELAXING: MORE THAN HALF THE DAYS

## 2017-12-20 ASSESSMENT — PATIENT HEALTH QUESTIONNAIRE - PHQ9
SUM OF ALL RESPONSES TO PHQ QUESTIONS 1-9: 8
SUM OF ALL RESPONSES TO PHQ QUESTIONS 1-9: 8
10. IF YOU CHECKED OFF ANY PROBLEMS, HOW DIFFICULT HAVE THESE PROBLEMS MADE IT FOR YOU TO DO YOUR WORK, TAKE CARE OF THINGS AT HOME, OR GET ALONG WITH OTHER PEOPLE: SOMEWHAT DIFFICULT

## 2017-12-20 NOTE — TELEPHONE ENCOUNTER
Controlled Substance Refill Request for AMbien and Xanax  Problem List Complete:  No     PROVIDER TO CONSIDER COMPLETION OF PROBLEM LIST AND OVERVIEW/CONTROLLED SUBSTANCE AGREEMENT    Last Written Prescription Date:  Xanax 1mg on 12-20-17          Ambien 5mg  11-17-17 for #12 with o rfs          Last Office Visit with [facility] [place]: 12-20-17    Future Office visit:     Controlled substance agreement on file: No.     Processing:  Fax Rx to [facility] pharmacy     checked in past 6 months?  Yes 12-20-17 ambien adn xanax filled by [facility]- Percocet from outside provider

## 2017-12-20 NOTE — NURSING NOTE
"Chief Complaint   Patient presents with     Recheck Medication       Initial /84 (BP Location: Left arm, Patient Position: Chair, Cuff Size: Adult Large)  Pulse 94  Temp 97.2  F (36.2  C) (Tympanic)  Ht 5' 1\" (1.549 m)  Wt 246 lb 8 oz (111.8 kg)  LMP  (Approximate)  SpO2 98%  Breastfeeding? No  BMI 46.58 kg/m2 Estimated body mass index is 46.58 kg/(m^2) as calculated from the following:    Height as of this encounter: 5' 1\" (1.549 m).    Weight as of this encounter: 246 lb 8 oz (111.8 kg).  Medication Reconciliation: complete       Bernie Kirkpatrick MA     "

## 2017-12-20 NOTE — MR AVS SNAPSHOT
"              After Visit Summary   12/20/2017    Sabine Burnett    MRN: [id number]           Patient Information     Date Of Birth          1985        Visit Information        Provider Department      12/20/2017 3:00 PM Magdalena Schwarz PA-C [facility]        Today's Diagnoses     Adjustment disorder with mixed anxiety and depressed mood    -  1    Panic disorder without agoraphobia        Psychophysiological insomnia          Care Instructions    [facility] [phone]          Follow-ups after your visit        Who to contact     If you have questions or need follow up information about today's clinic visit or your schedule please contact [facility] directly at [phone].  Normal or non-critical lab and imaging results will be communicated to you by MyChart, letter or phone within 4 business days after the clinic has received the results. If you do not hear from us within 7 days, please contact the clinic through MyChart or phone. If you have a critical or abnormal lab result, we will notify you by phone as soon as possible.  Submit refill requests through [employer] or call your pharmacy and they will forward the refill request to us. Please allow 3 business days for your refill to be completed.          Additional Information About Your Visit        MyChart Information     [employer] lets you send messages to your doctor, view your test results, renew your prescriptions, schedule appointments and more. To sign up, go to [email or web address] . Click on \"Log in\" on the left side of the screen, which will take you to the Welcome page. Then click on \"Sign up Now\" on the right side of the page.     You will be asked to enter the access code listed below, as well as some personal information. Please follow the directions to create your username and password.     Your access code is: [email or web address]  Expires: 12/26/2017 " " 1:36 PM     Your access code will  in 90 days. If you need help or a new code, please call your [facility] or [phone].        Care EveryWhere ID     This is your Care EveryWhere ID. This could be used by other organizations to access your [facility] medical records  [id number]        Your Vitals Were     Pulse Temperature Height Last Period Pulse Oximetry Breastfeeding?    94 97.2  F (36.2  C) (Tympanic) 5' 1\" (1.549 m) (Approximate) 98% No    BMI (Body Mass Index)                   46.58 kg/m2            Blood Pressure from Last 3 Encounters:   17 132/84   17 130/80   10/20/17 130/86    Weight from Last 3 Encounters:   17 246 lb 8 oz (111.8 kg)   17 247 lb (112 kg)   10/20/17 240 lb (108.9 kg)              Today, you had the following     No orders found for display         Today's Medication Changes          These changes are accurate as of: 17  3:30 PM.  If you have any questions, ask your nurse or doctor.               Start taking these medicines.        Dose/Directions    prazosin 1 MG capsule   Commonly known as:  MINIPRESS   Used for:  Panic disorder without agoraphobia, Psychophysiological insomnia   Started by:  Magdalena Schwarz PA-C        Take 1 cap (1 mg) at bedtime. May increase to 2 cap (2 mg) after 2-3 days.   Quantity:  50 capsule   Refills:  0         Stop taking these medicines if you haven't already. Please contact your care team if you have questions.     propranolol 10 MG tablet   Commonly known as:  INDERAL   Stopped by:  Magdalena Schwarz PA-C           zolpidem 5 MG tablet   Commonly known as:  AMBIEN   Stopped by:  Magdalena Schwarz PA-C                Where to get your medicines      These medications were sent to [facility] [address] [facility] [address] [facility] [address]  [address] [facility] [address]     Phone:  [phone]     prazosin 1 MG capsule         Some of these will need a " paper prescription and others can be bought over the counter.  Ask your nurse if you have questions.     Bring a paper prescription for each of these medications     ALPRAZolam 1 MG tablet                Primary Care Provider Fax #    Physician No Ref-Primary [phone]       No address on file        Equal Access to Services     JAMARPARRIS JEWEL : Hadii aad ku hadnicole Sosiriali, waaxda luqadaha, qaybta kaalmada adeegyada, arabella leannain hayaameredith patelhali niño eliseo remy. So [facility] [phone].    ATENCIÓN: Si habla español, tiene a cutler disposición servicios gratuitos de asistencia lingüística. Llame al [phone].    We comply with applicable federal civil rights laws and [place] laws. We do not discriminate on the basis of race, color, national origin, age, disability, sex, sexual orientation, or gender identity.            Thank you!     Thank you for choosing [facility]  for your care. Our goal is always to provide you with excellent care. Hearing back from our patients is one way we can continue to improve our services. Please take a few minutes to complete the written survey that you may receive in the mail after your visit with us. Thank you!             Your Updated Medication List - Protect others around you: Learn how to safely use, store and throw away your medicines at www.disposemymeds.org.          This list is accurate as of: 12/20/17  3:30 PM.  Always use your most recent med list.                   Brand Name Dispense Instructions for use Diagnosis    ALPRAZolam 1 MG tablet    XANAX    20 tablet    Take 1 tablet (1 mg) by mouth daily as needed for anxiety    Panic disorder without agoraphobia       FLUoxetine 20 MG tablet     30 tablet    Take 1 tablet (20 mg) by mouth daily    Adjustment disorder with mixed anxiety and depressed mood, Panic disorder without agoraphobia       prazosin 1 MG capsule    MINIPRESS    50 capsule    Take 1 cap (1 mg) at bedtime. May increase to  2 cap (2 mg) after 2-3 days.    Panic disorder without agoraphobia, Psychophysiological insomnia

## 2017-12-20 NOTE — PROGRESS NOTES
"  SUBJECTIVE:   Sabine Burnett is a 32 year old female who presents to clinic today for the following health issues:    Refill request : Ambien 5 mg tablets and Xanax 1 mg tablets    LMP : Unsure - Has been spotting and was on the Depo for contraceptive and would like to be pregnant in the near future     Answers for HPI/ROS submitted by the patient on 12/20/2017   If you checked off any problems, how difficult have these problems made it for you to do your work, take care of things at home, or get along with other people?: Somewhat difficult  PHQ9 TOTAL SCORE: 8  JOES 7 TOTAL SCORE: 11      HPI additional notes:    Chief Complaint   Patient presents with     Recheck Medication     Rosenda presents today for f/u grief, anxiety, depression, and panic d/o. Patient's infant son passed away from SIDS 9/2/2017. Reports severe anxiety and depression since his death. Has been taking alprazolam, fluoxetine. Given short supply of Ambien last month due to insomnia; patient feels \"I need it every night.\" Also given propranolol to help with anxiety, but patient never picked it up. Never f/u or established with a counselor.    JOSE-7 SCORE 9/27/2017 11/17/2017 12/20/2017   Total Score - - 11 (moderate anxiety)   Total Score 21 14 11     PHQ-9 SCORE 9/27/2017 11/17/2017 12/20/2017   Total Score MyChart - - 8 (Mild depression)   Total Score 14 11 8      ROS:    A Comprehensive greater than 10 system review of systems was carried out.    Pertinent positives and negatives are noted above.  Otherwise negative for contributory information.    Chart Review:  History   Smoking Status     Current Every Day Smoker     Packs/day: 0.50     Years: 6.00     Types: Cigarettes   Smokeless Tobacco     Never Used     Comment: declined quit program 3/19/12       Patient Active Problem List   Diagnosis     Back pain     Adjustment disorder with mixed anxiety and depressed mood     Panic disorder without agoraphobia     Morbid obesity (H)     " "Grief at loss of child     History reviewed. No pertinent surgical history.  Problem list, Medication list, Allergies, Medical/Social/Surg hx reviewed in [employer], updated as appropriate.   OBJECTIVE:                                                    /84 (BP Location: Left arm, Patient Position: Chair, Cuff Size: Adult Large)  Pulse 94  Temp 97.2  F (36.2  C) (Tympanic)  Ht 5' 1\" (1.549 m)  Wt 246 lb 8 oz (111.8 kg)  LMP  (Approximate)  SpO2 98%  Breastfeeding? No  BMI 46.58 kg/m2  Body mass index is 46.58 kg/(m^2).  GENERAL: obese, no acute distress  PSYCH: subdued, tearful at times  EYES: no discharge, no injection  HENT:  Normocephalic. Moist mucus membranes.  NECK:  Supple, symmetric  EXTREMITIES:  No gross deformities, moves all 4 limbs spontaneously  SKIN:  Warm and dry, no rash or suspicious lesions    NEUROLOGIC: alert, sensation grossly intact.    Diagnostic test results: none     ASSESSMENT/PLAN:                                                          ICD-10-CM    1. Adjustment disorder with mixed anxiety and depressed mood F43.23    2. Psychophysiological insomnia F51.04 prazosin (MINIPRESS) 1 MG capsule   3. Panic disorder without agoraphobia F41.0 prazosin (MINIPRESS) 1 MG capsule     ALPRAZolam (XANAX) 1 MG tablet     Patient has been on daily alprazolam since I met her in September; continues to request dose increase, minimally compliant with other treatment recommendations, and has not f/u with counseling center. Suspect patient self-medicating, rather than facing and working through her grief. Now requesting additional Ambien to help with sleep. Reviewed alprazolam and Ambien are short-term medications, not suitable for long-term or daily use. Feel patient would be good candidate for nightly prazosin as likely has some component of PTSD related to her son's death (patient co-slept with son, woke to find him dead), this would also be safer long-term option for insomnia and anxiety, rather " than continue use of Ambien. Discussed my concern that she has not scheduled with a counselor; it is not healthy to ignore or hide grief with medications, ultimately needs to work through this with a counselor. Provided with contact information for counseling center, instructed patient to schedule an appt ASAP. Discussed I do not feel comfortable keeping her at daily use of alprazolam as she has used this for 3 months already; will fill for #20, expected to last 1 month or longer. Offered to increased fluoxetine dose to help with any increase in anxiety, but patient declines. Is worried about decreasing alprazolam use, but again discuss the healthiest, long-term thing is to begin to process her feelings (with help of a counselor) rather than continue to mask or suppress them.    Please see patient instructions for treatment details.    Follow up office visit in 1 month, sooner PRN.    Magdalena Schwarz PA-C  [facility]

## 2017-12-21 ASSESSMENT — ANXIETY QUESTIONNAIRES: GAD7 TOTAL SCORE: 11

## 2017-12-21 ASSESSMENT — PATIENT HEALTH QUESTIONNAIRE - PHQ9: SUM OF ALL RESPONSES TO PHQ QUESTIONS 1-9: 8

## 2018-01-19 ENCOUNTER — OFFICE VISIT (OUTPATIENT)
Dept: FAMILY MEDICINE | Facility: CLINIC | Age: 33
End: 2018-01-19
Payer: MEDICAID

## 2018-01-19 VITALS
HEIGHT: 61 IN | BODY MASS INDEX: 46.63 KG/M2 | DIASTOLIC BLOOD PRESSURE: 86 MMHG | RESPIRATION RATE: 18 BRPM | WEIGHT: 247 LBS | SYSTOLIC BLOOD PRESSURE: 134 MMHG | OXYGEN SATURATION: 96 % | HEART RATE: 111 BPM

## 2018-01-19 DIAGNOSIS — F51.04 PSYCHOPHYSIOLOGICAL INSOMNIA: ICD-10-CM

## 2018-01-19 DIAGNOSIS — F41.0 PANIC DISORDER WITHOUT AGORAPHOBIA: ICD-10-CM

## 2018-01-19 DIAGNOSIS — Z63.4 GRIEF AT LOSS OF CHILD: ICD-10-CM

## 2018-01-19 DIAGNOSIS — F43.23 ADJUSTMENT DISORDER WITH MIXED ANXIETY AND DEPRESSED MOOD: Primary | ICD-10-CM

## 2018-01-19 DIAGNOSIS — F43.21 GRIEF AT LOSS OF CHILD: ICD-10-CM

## 2018-01-19 PROCEDURE — 99214 OFFICE O/P EST MOD 30 MIN: CPT | Performed by: PHYSICIAN ASSISTANT

## 2018-01-19 RX ORDER — PRAZOSIN HYDROCHLORIDE 1 MG/1
1 CAPSULE ORAL AT BEDTIME
Qty: 4 CAPSULE | Refills: 0 | Status: SHIPPED | OUTPATIENT
Start: 2018-01-19

## 2018-01-19 RX ORDER — FLUOXETINE 20 MG/1
40 TABLET, FILM COATED ORAL DAILY
Qty: 60 TABLET | Refills: 1 | Status: SHIPPED | OUTPATIENT
Start: 2018-01-19

## 2018-01-19 RX ORDER — ALPRAZOLAM 1 MG
1 TABLET ORAL DAILY PRN
Qty: 10 TABLET | Refills: 0 | Status: SHIPPED | OUTPATIENT
Start: 2018-01-19

## 2018-01-19 RX ORDER — PRAZOSIN HYDROCHLORIDE 2 MG/1
2 CAPSULE ORAL AT BEDTIME
Qty: 30 CAPSULE | Refills: 0 | Status: SHIPPED | OUTPATIENT
Start: 2018-01-22

## 2018-01-19 SDOH — SOCIAL STABILITY - SOCIAL INSECURITY: DISSAPEARANCE AND DEATH OF FAMILY MEMBER: Z63.4

## 2018-01-19 ASSESSMENT — ANXIETY QUESTIONNAIRES
7. FEELING AFRAID AS IF SOMETHING AWFUL MIGHT HAPPEN: SEVERAL DAYS
4. TROUBLE RELAXING: MORE THAN HALF THE DAYS
5. BEING SO RESTLESS THAT IT IS HARD TO SIT STILL: NEARLY EVERY DAY
GAD7 TOTAL SCORE: 15
1. FEELING NERVOUS, ANXIOUS, OR ON EDGE: NEARLY EVERY DAY
3. WORRYING TOO MUCH ABOUT DIFFERENT THINGS: MORE THAN HALF THE DAYS
6. BECOMING EASILY ANNOYED OR IRRITABLE: NEARLY EVERY DAY
7. FEELING AFRAID AS IF SOMETHING AWFUL MIGHT HAPPEN: SEVERAL DAYS
GAD7 TOTAL SCORE: 15
GAD7 TOTAL SCORE: 15
2. NOT BEING ABLE TO STOP OR CONTROL WORRYING: SEVERAL DAYS

## 2018-01-19 ASSESSMENT — PATIENT HEALTH QUESTIONNAIRE - PHQ9
SUM OF ALL RESPONSES TO PHQ QUESTIONS 1-9: 14
SUM OF ALL RESPONSES TO PHQ QUESTIONS 1-9: 14
10. IF YOU CHECKED OFF ANY PROBLEMS, HOW DIFFICULT HAVE THESE PROBLEMS MADE IT FOR YOU TO DO YOUR WORK, TAKE CARE OF THINGS AT HOME, OR GET ALONG WITH OTHER PEOPLE: SOMEWHAT DIFFICULT

## 2018-01-19 NOTE — PROGRESS NOTES
SUBJECTIVE:   Sabine Burnett is a 32 year old female who presents to clinic today for the following health issues:      Medication Followup of ANXIETY MEDICATION    Taking Medication as prescribed: yes    Side Effects:  None    Medication Helping Symptoms:  yes       HPI additional notes:   Chief Complaint   Patient presents with     Recheck Medication     Rosenda presents today for adjustment disorder f/u. Patient's son passed away of SIDS 9/2017. Has been started on fluoxetine, prazosin, and prn Xanax for anxiety and depression sx. Patient has been referred to counseling center many times but has not followed up; also given online resources, but again has not followed up. Patient reports gap in insurance coverage, so was not able to  prazosin or fluoxetine due to cost; did fill #20 Xanax. Have been titrating down Xanax, patient requests increase. States she has panic episodes daily and needs more frequent Xanax. Insurance should be reinstated on Monday.      PHQ-9 SCORE 11/17/2017 12/20/2017 1/19/2018   Total Score MyChart - 8 (Mild depression) 14 (Moderate depression)   Total Score 11 8 14     JOSE-7 SCORE 11/17/2017 12/20/2017 1/19/2018   Total Score - 11 (moderate anxiety) 15 (severe anxiety)   Total Score 14 11 15      ROS:    A Comprehensive greater than 10 system review of systems was carried out.    Pertinent positives and negatives are noted above.  Otherwise negative for contributory information.      Chart Review:  History   Smoking Status     Current Every Day Smoker     Packs/day: 0.50     Years: 6.00     Types: Cigarettes   Smokeless Tobacco     Never Used     Comment: declined quit program 3/19/12       Patient Active Problem List   Diagnosis     Back pain     Adjustment disorder with mixed anxiety and depressed mood     Panic disorder without agoraphobia     Morbid obesity (H)     Grief at loss of child     History reviewed. No pertinent surgical history.  Problem list, Medication list,  "Allergies, Medical/Social/Surg hx reviewed in [facility], updated as appropriate.   OBJECTIVE:                                                    /86  Pulse 111  Resp 18  Ht 5' 1\" (1.549 m)  Wt 247 lb (112 kg)  LMP  (Approximate)  SpO2 96%  BMI 46.67 kg/m2  Body mass index is 46.67 kg/(m^2).  GENERAL: obese, no acute distress  PSYCH: Mental Status Exam  Behavior: agitated  Speech: normal rate and rhythm  Mood: depressed and anxious  Affect: Tearful  Thought Processes: Goal directed  Thought Content: denies suicidal ideation, intent or thoughts  Insight: Fair  Judgment: Adequate for safety  EYES: no discharge, no injection  HENT:  Normocephalic. Moist mucus membranes.  NECK:  Supple, symmetric  EXTREMITIES:  No gross deformities, moves all 4 limbs spontaneously  SKIN:  Warm and dry, no rash or suspicious lesions    NEUROLOGIC: alert but intermittently somnolent, sensation grossly intact.    Diagnostic test results: none     ASSESSMENT/PLAN:                                                          ICD-10-CM    1. Adjustment disorder with mixed anxiety and depressed mood F43.23 FLUoxetine 20 MG tablet   2. Psychophysiological insomnia F51.04 prazosin (MINIPRESS) 1 MG capsule     prazosin (MINIPRESS) 2 MG capsule   3. Panic disorder without agoraphobia F41.0 prazosin (MINIPRESS) 1 MG capsule     FLUoxetine 20 MG tablet     ALPRAZolam (XANAX) 1 MG tablet   4. Grief at loss of child F43.21     Z63.4      Looked up medication coverage at [facility]; discussed fluoxetine and prazosin are both on $10 generic list, so are both cheaper than the Xanax. Patient very focused on Xanax during visit. Reviewed it is imperative she seeking therapy/counseling to deal with her grief and associated mood disorder; medications alone will not be helpful. Reviewed safety concerns with long-term, chronic use of BZD; continue to recommend limited use of Xanax. Also some concern about potential drug-seeking behavior in patient given " today's and past visits; recommend continued taper of Xanax, with ultimate removal of medication. Xanax refilled for #10 today; discussed she can split tablets in order to spread out throughout the month, but ultimately recommend continued taper. Increase fluoxetine 40 mg qd and start prazosin 1 mg qHS. Increase prazosin 2 mg qHS after 2-3 days. Schedule appt with counselor/therapist.    Please see patient instructions for treatment details.  30 minutes total spent on this encounter, >50% spent in direct communication with the patient.    Follow up office visit in 1 month for medication check, sooner PRN.    Magdalena Schwarz PA-C  [facility]

## 2018-01-19 NOTE — MR AVS SNAPSHOT
"              After Visit Summary   1/19/2018    Sabine Burnett    MRN: [id number]           Patient Information     Date Of Birth          1985        Visit Information        Provider Department      1/19/2018 1:40 PM Magdalena Schwarz PA-C [facility]        Today's Diagnoses     Adjustment disorder with mixed anxiety and depressed mood    -  1    Panic disorder without agoraphobia        Psychophysiological insomnia          Care Instructions    [facility]: [facility] [phone]              Follow-ups after your visit        Who to contact     If you have questions or need follow up information about today's clinic visit or your schedule please contact [facility] directly at [phone].  Normal or non-critical lab and imaging results will be communicated to you by MyChart, letter or phone within 4 business days after the clinic has received the results. If you do not hear from us within 7 days, please contact the clinic through MyChart or phone. If you have a critical or abnormal lab result, we will notify you by phone as soon as possible.  Submit refill requests through [employer] or call your pharmacy and they will forward the refill request to us. Please allow 3 business days for your refill to be completed.          Additional Information About Your Visit        MyChart Information     [employer] lets you send messages to your doctor, view your test results, renew your prescriptions, schedule appointments and more. To sign up, go to [email or web address] . Click on \"Log in\" on the left side of the screen, which will take you to the Welcome page. Then click on \"Sign up Now\" on the right side of the page.     You will be asked to enter the access code listed below, as well as some personal information. Please follow the directions to create your username and password.     Your access code is: [email or web address]  Expires: " "2018  2:22 PM     Your access code will  in 90 days. If you need help or a new code, please call your [facility] clinic or [phone].        Care EveryWhere ID     This is your Care EveryWhere ID. This could be used by other organizations to access your [facility] medical records  [id number]        Your Vitals Were     Pulse Respirations Height Last Period Pulse Oximetry BMI (Body Mass Index)    111 18 5' 1\" (1.549 m) (Approximate) 96% 46.67 kg/m2       Blood Pressure from Last 3 Encounters:   18 134/86   17 132/84   17 130/80    Weight from Last 3 Encounters:   18 247 lb (112 kg)   17 246 lb 8 oz (111.8 kg)   17 247 lb (112 kg)              Today, you had the following     No orders found for display         Today's Medication Changes          These changes are accurate as of: 18  2:22 PM.  If you have any questions, ask your nurse or doctor.               These medicines have changed or have updated prescriptions.        Dose/Directions    FLUoxetine 20 MG tablet   This may have changed:  how much to take   Used for:  Adjustment disorder with mixed anxiety and depressed mood, Panic disorder without agoraphobia   Changed by:  Magdalena Schwarz PA-C        Dose:  40 mg   Take 2 tablets (40 mg) by mouth daily   Quantity:  60 tablet   Refills:  1       * prazosin 1 MG capsule   Commonly known as:  MINIPRESS   This may have changed:    - how much to take  - how to take this  - when to take this  - additional instructions   Used for:  Panic disorder without agoraphobia, Psychophysiological insomnia   Changed by:  Magdalena Schwarz PA-C        Dose:  1 mg   Take 1 capsule (1 mg) by mouth At Bedtime .   Quantity:  4 capsule   Refills:  0       * prazosin 2 MG capsule   Commonly known as:  MINIPRESS   This may have changed:  You were already taking a medication with the same name, and this prescription was added. Make sure you understand how and when to take each. "   Used for:  Psychophysiological insomnia   Changed by:  Magdalena Schwarz PA-C        Dose:  2 mg   Start taking on:  1/22/2018   Take 1 capsule (2 mg) by mouth At Bedtime   Quantity:  30 capsule   Refills:  0       * Notice:  This list has 2 medication(s) that are the same as other medications prescribed for you. Read the directions carefully, and ask your doctor or other care provider to review them with you.         Where to get your medicines      These medications were sent to [facility] [address] [address] AT [address]  [address] [facility] [address]     Phone:  [phone]     FLUoxetine 20 MG tablet    prazosin 1 MG capsule    prazosin 2 MG capsule         Some of these will need a paper prescription and others can be bought over the counter.  Ask your nurse if you have questions.     Bring a paper prescription for each of these medications     ALPRAZolam 1 MG tablet                Primary Care Provider Fax #    Physician No Ref-Primary [phone]       No address on file        Equal Access to Services     WOODROW HOLLOWAY AH: Hadii aad ku hadasho Soomaali, waaxda luqadaha, qaybta kaalmada adeegyada, waxay leannain haycarolynn gomes . So [facility] [phone].    ATENCIÓN: Si habla español, tiene a cutler disposición servicios gratuitos de asistencia lingüística. Llame al [phone].    We comply with applicable federal civil rights laws and [place] laws. We do not discriminate on the basis of race, color, national origin, age, disability, sex, sexual orientation, or gender identity.            Thank you!     Thank you for choosing [facility]  for your care. Our goal is always to provide you with excellent care. Hearing back from our patients is one way we can continue to improve our services. Please take a few minutes to complete the written survey that you may receive in the mail after your visit with us.  Thank you!             Your Updated Medication List - Protect others around you: Learn how to safely use, store and throw away your medicines at www.disposemymeds.org.          This list is accurate as of: 1/19/18  2:22 PM.  Always use your most recent med list.                   Brand Name Dispense Instructions for use Diagnosis    ALPRAZolam 1 MG tablet    XANAX    10 tablet    Take 1 tablet (1 mg) by mouth daily as needed for anxiety    Panic disorder without agoraphobia       FLUoxetine 20 MG tablet     60 tablet    Take 2 tablets (40 mg) by mouth daily    Adjustment disorder with mixed anxiety and depressed mood, Panic disorder without agoraphobia       * prazosin 1 MG capsule    MINIPRESS    4 capsule    Take 1 capsule (1 mg) by mouth At Bedtime .    Panic disorder without agoraphobia, Psychophysiological insomnia       * prazosin 2 MG capsule   Start taking on:  1/22/2018    MINIPRESS    30 capsule    Take 1 capsule (2 mg) by mouth At Bedtime    Psychophysiological insomnia       * Notice:  This list has 2 medication(s) that are the same as other medications prescribed for you. Read the directions carefully, and ask your doctor or other care provider to review them with you.

## 2018-01-19 NOTE — NURSING NOTE
"Chief Complaint   Patient presents with     Recheck Medication       Initial /86  Pulse 111  Resp 18  Ht 5' 1\" (1.549 m)  Wt 247 lb (112 kg)  LMP  (Approximate)  SpO2 96%  BMI 46.67 kg/m2 Estimated body mass index is 46.67 kg/(m^2) as calculated from the following:    Height as of this encounter: 5' 1\" (1.549 m).    Weight as of this encounter: 247 lb (112 kg).  Medication Reconciliation: dedrick Sierra CMA      "

## 2018-01-23 ASSESSMENT — ANXIETY QUESTIONNAIRES: GAD7 TOTAL SCORE: 15

## 2024-11-19 ENCOUNTER — HOSPITAL ENCOUNTER (EMERGENCY)
Facility: CLINIC | Age: 39
Discharge: HOME | End: 2024-11-20
Attending: EMERGENCY MEDICINE | Admitting: EMERGENCY MEDICINE
Payer: MEDICAID

## 2024-11-19 VITALS
SYSTOLIC BLOOD PRESSURE: 121 MMHG | HEART RATE: 80 BPM | DIASTOLIC BLOOD PRESSURE: 82 MMHG | WEIGHT: 250 LBS | TEMPERATURE: 97.3 F | RESPIRATION RATE: 16 BRPM | BODY MASS INDEX: 47.24 KG/M2 | OXYGEN SATURATION: 97 %

## 2024-11-19 DIAGNOSIS — K08.89 PAIN, DENTAL: ICD-10-CM

## 2024-11-19 PROCEDURE — 99283 EMERGENCY DEPT VISIT LOW MDM: CPT | Performed by: EMERGENCY MEDICINE

## 2024-11-19 PROCEDURE — 250N000013 HC RX MED GY IP 250 OP 250 PS 637: Performed by: EMERGENCY MEDICINE

## 2024-11-19 RX ORDER — PENICILLIN V POTASSIUM 500 MG/1
500 TABLET, FILM COATED ORAL 4 TIMES DAILY
Qty: 28 TABLET | Refills: 0 | Status: SHIPPED | OUTPATIENT
Start: 2024-11-19 | End: 2024-11-26

## 2024-11-19 RX ORDER — PENICILLIN V POTASSIUM 500 MG/1
500 TABLET, FILM COATED ORAL ONCE
Status: COMPLETED | OUTPATIENT
Start: 2024-11-19 | End: 2024-11-19

## 2024-11-19 RX ADMIN — PENICILLIN V POTASSIUM 500 MG: 500 TABLET, FILM COATED ORAL at 23:35

## 2024-11-19 ASSESSMENT — ACTIVITIES OF DAILY LIVING (ADL): ADLS_ACUITY_SCORE: 0

## 2024-11-19 ASSESSMENT — COLUMBIA-SUICIDE SEVERITY RATING SCALE - C-SSRS
6. HAVE YOU EVER DONE ANYTHING, STARTED TO DO ANYTHING, OR PREPARED TO DO ANYTHING TO END YOUR LIFE?: NO
2. HAVE YOU ACTUALLY HAD ANY THOUGHTS OF KILLING YOURSELF IN THE PAST MONTH?: NO
1. IN THE PAST MONTH, HAVE YOU WISHED YOU WERE DEAD OR WISHED YOU COULD GO TO SLEEP AND NOT WAKE UP?: NO

## 2024-11-20 NOTE — DISCHARGE INSTRUCTIONS
Take complete course of penicillin.  Take ibuprofen 600 mg every 6 hours with food as needed for pain.  You may also take doses of acetaminophen in between doses of ibuprofen.    Follow-up with a dentist as soon as possible    Return to the emergency department if fever, facial swelling, or other concerns.    Many of these clinics offer a sliding fee option for patients that qualify, and see patients on a walk-in or same day basis. Please call each clinic directly. As services, hours, fees and policies vary greatly.    [place]:  [facility]     [phone]  [facility] ([facility]) [phone]  [facility]  [phone]  [facility]      [phone]   [facility]    [phone]  [facility]  [phone]  [facility] [phone]  [facility]     [phone]  [facility]   [phone]  [facility] (cash only)   [phone]  [facility]    [phone] (adults)          [phone] (children)    [place]:  [facility]     [phone]; [phone]  [facility]     [phone]  [facility]     [phone]  [facility] (free, limited)    [phone]    Multiple Locations:  [facility]       [phone]

## 2024-11-20 NOTE — ED TRIAGE NOTES
Pt reports front L tooth pain starting today. Pt states she feels pressure and swelling in her gums. Denies seeing a dentist recently.      Triage Assessment (Adult)       Row Name 11/19/24 4231          Triage Assessment    Airway WDL WDL        Respiratory WDL    Respiratory WDL WDL        Skin Circulation/Temperature WDL    Skin Circulation/Temperature WDL WDL        Cardiac WDL    Cardiac WDL WDL        Peripheral/Neurovascular WDL    Peripheral Neurovascular WDL WDL        Cognitive/Neuro/Behavioral WDL    Cognitive/Neuro/Behavioral WDL WDL

## 2024-11-20 NOTE — ED PROVIDER NOTES
ED Provider Note  [facility]      History     Chief Complaint   Patient presents with    Dental Pain     HPI  Lauryn Burnett is a 39 year old female who presents to the emergency department with dental pain.  Patient states that she developed pain in her left front tooth earlier this morning.  She has taken acetaminophen and ibuprofen with some relief of her symptoms.  The patient states that she feels that there is some pressure above her gum and some mild swelling.  She denies any facial swelling.  No fever.  No difficulty swallowing.    Past Medical History  Past Medical History:   Diagnosis Date    Abdominal pain, unspecified site     Arthritis     Leukocytosis, unspecified     Obesity     Plantar fasciitis, right      History reviewed. No pertinent surgical history.  penicillin V (VEETID) 500 MG tablet  ALPRAZolam (XANAX) 1 MG tablet  FLUoxetine 20 MG tablet  prazosin (MINIPRESS) 1 MG capsule  prazosin (MINIPRESS) 2 MG capsule      Allergies   Allergen Reactions    Nkda [No Known Drug Allergy]     Vicodin [Hydrocodone-Acetaminophen] Itching     Family History  No family history on file.  Social History   Social History     Tobacco Use    Smoking status: Every Day     Current packs/day: 0.50     Average packs/day: 0.5 packs/day for 6.0 years (3.0 ttl pk-yrs)     Types: Cigarettes    Smokeless tobacco: Never    Tobacco comments:     declined quit program 3/19/12   Substance Use Topics    Alcohol use: No     Comment: occasional    Drug use: No      A medically appropriate review of systems was performed with pertinent positives and negatives noted in the HPI, and all other systems negative.    Physical Exam   BP: 121/82  Pulse: 80  Temp: 97.3  F (36.3  C)  Resp: 16  Weight: 113.4 kg (250 lb)  SpO2: 97 %  Physical Exam  Vitals and nursing note reviewed.   HENT:      Head: Normocephalic and atraumatic.      Nose: Nose normal.      Mouth/Throat:      Mouth: Mucous membranes are moist.       Dentition: Abnormal dentition. Dental tenderness present. No gingival swelling.      Pharynx: Oropharynx is clear. Uvula midline.     Lymphadenopathy:      Cervical: No cervical adenopathy.   Skin:     General: Skin is warm and dry.   Neurological:      Mental Status: She is alert.           ED Course, Procedures, & Data      Procedures                No results found for any visits on 11/19/24.  Medications   penicillin V (VEETID) tablet 500 mg (has no administration in time range)     Labs Ordered and Resulted from Time of ED Arrival to Time of ED Departure - No data to display  No orders to display          Critical care was not performed.     Medical Decision Making  The patient's presentation was of straightforward complexity (a clearly self-limited or minor problem).    The patient's evaluation involved:  history and exam without other MDM data elements    The patient's management necessitated moderate risk (prescription drug management including medications given in the ED).    Assessment & Plan    39 year old female with 1 day history of dental pain.  Her left upper incisor is tender to percussion.  She has widespread decay including multiple teeth decayed to the gumline.  She does not have any gingival swelling but based on her symptoms, I have concern for developing periapical abscess.  She does not have any evidence for facial abscess, deep space neck infection, or Bernard's angina.  Will discharge on penicillin.  Dental resources provided for outpatient follow-up.  Ibuprofen and acetaminophen recommended for pain.  Return precautions provided.    I have reviewed the nursing notes. I have reviewed the findings, diagnosis, plan and need for follow up with the patient.    New Prescriptions    PENICILLIN V (VEETID) 500 MG TABLET    Take 1 tablet (500 mg) by mouth 4 times daily for 7 days.       Final diagnoses:   Pain, dental     Chart documentation was completed with Dragon voice-recognition software.  Even though reviewed, this chart may still contain some grammatical, spelling, and word errors.     Robert Stuart Md    [facility] EMERGENCY DEPARTMENT  11/19/2024     Robert Stuart MD  11/20/24 0210